# Patient Record
Sex: MALE | Race: WHITE | NOT HISPANIC OR LATINO
[De-identification: names, ages, dates, MRNs, and addresses within clinical notes are randomized per-mention and may not be internally consistent; named-entity substitution may affect disease eponyms.]

---

## 2021-11-03 PROBLEM — E34.8 PINEAL GLAND CYST: Status: ACTIVE | Noted: 2021-11-03

## 2021-11-03 PROBLEM — Z78.9 SOCIAL ALCOHOL USE: Status: ACTIVE | Noted: 2021-11-03

## 2021-11-03 PROBLEM — Z00.00 ENCOUNTER FOR PREVENTIVE HEALTH EXAMINATION: Status: ACTIVE | Noted: 2021-11-03

## 2021-11-03 PROBLEM — Z78.9 NO PERTINENT PAST MEDICAL HISTORY: Status: RESOLVED | Noted: 2021-11-03 | Resolved: 2021-11-03

## 2021-11-03 PROBLEM — Z78.9 CURRENT NON-SMOKER: Status: ACTIVE | Noted: 2021-11-03

## 2021-11-03 RX ORDER — GABAPENTIN 300 MG
300 TABLET ORAL
Refills: 0 | Status: ACTIVE | COMMUNITY

## 2021-11-08 ENCOUNTER — APPOINTMENT (OUTPATIENT)
Dept: NEUROSURGERY | Facility: CLINIC | Age: 31
End: 2021-11-08
Payer: COMMERCIAL

## 2021-11-08 VITALS
OXYGEN SATURATION: 98 % | WEIGHT: 225 LBS | HEIGHT: 76 IN | HEART RATE: 66 BPM | RESPIRATION RATE: 18 BRPM | DIASTOLIC BLOOD PRESSURE: 70 MMHG | BODY MASS INDEX: 27.4 KG/M2 | TEMPERATURE: 98 F | SYSTOLIC BLOOD PRESSURE: 115 MMHG

## 2021-11-08 DIAGNOSIS — Z78.9 OTHER SPECIFIED HEALTH STATUS: ICD-10-CM

## 2021-11-08 DIAGNOSIS — E34.8 OTHER SPECIFIED ENDOCRINE DISORDERS: ICD-10-CM

## 2021-11-08 PROCEDURE — 99204 OFFICE O/P NEW MOD 45 MIN: CPT

## 2021-11-08 NOTE — PHYSICAL EXAM
[General Appearance - Alert] : alert [General Appearance - In No Acute Distress] : in no acute distress [General Appearance - Well Nourished] : well nourished [General Appearance - Well-Appearing] : healthy appearing [Oriented To Time, Place, And Person] : oriented to person, place, and time [Impaired Insight] : insight and judgment were intact [Affect] : the affect was normal [Memory Recent] : recent memory was not impaired [Person] : oriented to person [Place] : oriented to place [Time] : oriented to time [Cranial Nerves Optic (II)] : visual acuity intact bilaterally,  pupils equal round and reactive to light [Cranial Nerves Oculomotor (III)] : extraocular motion intact [Cranial Nerves Trigeminal (V)] : facial sensation intact symmetrically [Cranial Nerves Facial (VII)] : face symmetrical [Cranial Nerves Vestibulocochlear (VIII)] : hearing was intact bilaterally [Cranial Nerves Glossopharyngeal (IX)] : tongue and palate midline [Cranial Nerves Accessory (XI - Cranial And Spinal)] : head turning and shoulder shrug symmetric [Cranial Nerves Hypoglossal (XII)] : there was no tongue deviation with protrusion [Motor Tone] : muscle tone was normal in all four extremities [Motor Strength] : muscle strength was normal in all four extremities

## 2021-11-08 NOTE — ASSESSMENT
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 Neuro exam is unremarkable. His current symptoms are unlikely trigeminal neuralgia which I recommend to continue to follow up with dentist.\par \par PLAN\par - repeat MRI brain w/wo in 6 months\par - RTC after image

## 2021-11-08 NOTE — HISTORY OF PRESENT ILLNESS
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 He is currently taking gabapentin 300mg BID without significant improvement. As per patient, he was also evaluated by oral sx where he was told negative TMJ. He states pain does not initiated by touching his face/combing his facial hair and denies numbness/tingling on his face. \par He denies headache, dizziness, seizure activity, visual changes, weakness, BB dysfunction, balance problem or any other focal neuro deficits. \par \par MRI brain w/wo was completed by neurology where pineal gland cyst measuring 1.4 cm x 1.6 cm x 1.4 cm contacting superior colliculus without skull base arterial flow obstruction.

## 2021-11-08 NOTE — DATA REVIEWED
[de-identified] : brain w/wo on 9/15/2021 @ R showed 1.4 cm x 1.6 cm x 1.4 cm pineal gland cyst contacting superior colliculus. unremarkabel arterial flow voids at that skull base.